# Patient Record
Sex: MALE | Race: WHITE | NOT HISPANIC OR LATINO | Employment: OTHER | URBAN - METROPOLITAN AREA
[De-identification: names, ages, dates, MRNs, and addresses within clinical notes are randomized per-mention and may not be internally consistent; named-entity substitution may affect disease eponyms.]

---

## 2023-08-28 PROBLEM — G51.0 BELL'S PALSY: Status: ACTIVE | Noted: 2023-08-28

## 2023-08-28 PROBLEM — E55.9 VITAMIN D DEFICIENCY: Status: ACTIVE | Noted: 2023-08-28

## 2023-08-28 PROBLEM — R93.1 ELEVATED CORONARY ARTERY CALCIUM SCORE: Status: ACTIVE | Noted: 2023-08-28

## 2023-08-28 PROBLEM — R91.1 PULMONARY NODULE: Status: ACTIVE | Noted: 2023-08-28

## 2023-08-28 PROBLEM — E78.1 HYPERTRIGLYCERIDEMIA: Status: ACTIVE | Noted: 2023-08-28

## 2023-08-28 PROBLEM — D69.6 THROMBOCYTOPENIA (CMS-HCC): Status: ACTIVE | Noted: 2023-08-28

## 2023-08-28 PROBLEM — I71.21 ANEURYSM OF ASCENDING AORTA WITHOUT RUPTURE (CMS-HCC): Status: ACTIVE | Noted: 2023-08-28

## 2023-08-28 PROBLEM — I10 ESSENTIAL HYPERTENSION: Status: ACTIVE | Noted: 2023-08-28

## 2023-08-28 PROBLEM — K70.0 ALCOHOLIC FATTY LIVER: Status: ACTIVE | Noted: 2023-08-28

## 2023-08-28 PROBLEM — L71.9 ROSACEA: Status: ACTIVE | Noted: 2023-08-28

## 2023-08-28 PROBLEM — M1A.9XX0 CHRONIC GOUT WITHOUT TOPHUS: Status: ACTIVE | Noted: 2023-08-28

## 2023-08-28 PROBLEM — F10.10 ETOH ABUSE: Status: ACTIVE | Noted: 2023-08-28

## 2023-08-28 RX ORDER — DOXYCYCLINE HYCLATE 50 MG/1
1 CAPSULE ORAL DAILY
COMMUNITY
End: 2023-12-01 | Stop reason: ALTCHOICE

## 2023-08-28 RX ORDER — ALLOPURINOL 300 MG/1
1 TABLET ORAL DAILY
COMMUNITY
End: 2023-12-01 | Stop reason: DRUGHIGH

## 2023-08-28 RX ORDER — MULTIVIT WITH CALCIUM,IRON,MIN 18MG-0.4MG
TABLET ORAL
COMMUNITY

## 2023-08-28 RX ORDER — PNV NO.95/FERROUS FUM/FOLIC AC 28MG-0.8MG
1 TABLET ORAL DAILY
COMMUNITY
Start: 2014-09-12

## 2023-08-28 RX ORDER — FENOFIBRATE 67 MG/1
1 CAPSULE ORAL
COMMUNITY
End: 2023-12-01 | Stop reason: SDUPTHER

## 2023-08-28 RX ORDER — ACETAMINOPHEN 500 MG
1 TABLET ORAL DAILY
COMMUNITY
Start: 2019-03-27

## 2023-08-28 RX ORDER — ATORVASTATIN CALCIUM 10 MG/1
1 TABLET, FILM COATED ORAL DAILY
COMMUNITY
End: 2024-06-07 | Stop reason: SDUPTHER

## 2023-11-02 ENCOUNTER — LAB (OUTPATIENT)
Dept: LAB | Facility: LAB | Age: 58
End: 2023-11-02
Payer: COMMERCIAL

## 2023-11-02 DIAGNOSIS — E53.8 DEFICIENCY OF OTHER SPECIFIED B GROUP VITAMINS: ICD-10-CM

## 2023-11-02 DIAGNOSIS — E79.0 HYPERURICEMIA WITHOUT SIGNS OF INFLAMMATORY ARTHRITIS AND TOPHACEOUS DISEASE: ICD-10-CM

## 2023-11-02 DIAGNOSIS — Z12.5 ENCOUNTER FOR SCREENING FOR MALIGNANT NEOPLASM OF PROSTATE: ICD-10-CM

## 2023-11-02 DIAGNOSIS — I10 ESSENTIAL (PRIMARY) HYPERTENSION: ICD-10-CM

## 2023-11-02 DIAGNOSIS — K76.0 FATTY (CHANGE OF) LIVER, NOT ELSEWHERE CLASSIFIED: Primary | ICD-10-CM

## 2023-11-02 DIAGNOSIS — E55.9 VITAMIN D DEFICIENCY, UNSPECIFIED: ICD-10-CM

## 2023-11-02 DIAGNOSIS — E78.1 PURE HYPERGLYCERIDEMIA: ICD-10-CM

## 2023-11-02 LAB
25(OH)D3 SERPL-MCNC: 34 NG/ML (ref 31–100)
ALBUMIN SERPL-MCNC: 4.9 G/DL (ref 3.5–5)
ALP BLD-CCNC: 55 U/L (ref 35–125)
ALT SERPL-CCNC: 24 U/L (ref 5–40)
ANION GAP SERPL CALC-SCNC: >19 MMOL/L
AST SERPL-CCNC: 20 U/L (ref 5–40)
BILIRUB SERPL-MCNC: 0.7 MG/DL (ref 0.1–1.2)
BUN SERPL-MCNC: 17 MG/DL (ref 8–25)
CALCIUM SERPL-MCNC: 9.6 MG/DL (ref 8.5–10.4)
CHLORIDE SERPL-SCNC: 101 MMOL/L (ref 97–107)
CHOLEST SERPL-MCNC: 130 MG/DL (ref 133–200)
CHOLEST/HDLC SERPL: 3.9 {RATIO}
CO2 SERPL-SCNC: 20 MMOL/L (ref 24–31)
CREAT SERPL-MCNC: 1 MG/DL (ref 0.4–1.6)
ERYTHROCYTE [DISTWIDTH] IN BLOOD BY AUTOMATED COUNT: 13 % (ref 11.5–14.5)
GFR SERPL CREATININE-BSD FRML MDRD: 88 ML/MIN/1.73M*2
GLUCOSE SERPL-MCNC: 80 MG/DL (ref 65–99)
HCT VFR BLD AUTO: 43.3 % (ref 41–52)
HDLC SERPL-MCNC: 33 MG/DL
HGB BLD-MCNC: 14.7 G/DL (ref 13.5–17.5)
LDLC SERPL CALC-MCNC: 46 MG/DL (ref 65–130)
MCH RBC QN AUTO: 29.9 PG (ref 26–34)
MCHC RBC AUTO-ENTMCNC: 33.9 G/DL (ref 32–36)
MCV RBC AUTO: 88 FL (ref 80–100)
NRBC BLD-RTO: 0 /100 WBCS (ref 0–0)
PLATELET # BLD AUTO: 126 X10*3/UL (ref 150–450)
POTASSIUM SERPL-SCNC: 4.4 MMOL/L (ref 3.4–5.1)
PROT SERPL-MCNC: 7.2 G/DL (ref 5.9–7.9)
PSA SERPL-MCNC: 2 NG/ML
RBC # BLD AUTO: 4.92 X10*6/UL (ref 4.5–5.9)
SODIUM SERPL-SCNC: 141 MMOL/L (ref 133–145)
TRIGL SERPL-MCNC: 254 MG/DL (ref 40–150)
TSH SERPL DL<=0.05 MIU/L-ACNC: 2.46 MIU/L (ref 0.27–4.2)
URATE SERPL-MCNC: 7.1 MG/DL (ref 3.6–7.7)
VIT B12 SERPL-MCNC: 604 PG/ML (ref 211–946)
WBC # BLD AUTO: 6 X10*3/UL (ref 4.4–11.3)

## 2023-11-02 PROCEDURE — 82306 VITAMIN D 25 HYDROXY: CPT

## 2023-11-02 PROCEDURE — 80053 COMPREHEN METABOLIC PANEL: CPT

## 2023-11-02 PROCEDURE — 80061 LIPID PANEL: CPT

## 2023-11-02 PROCEDURE — 36415 COLL VENOUS BLD VENIPUNCTURE: CPT

## 2023-11-02 PROCEDURE — 84550 ASSAY OF BLOOD/URIC ACID: CPT

## 2023-11-02 PROCEDURE — 85027 COMPLETE CBC AUTOMATED: CPT

## 2023-11-02 PROCEDURE — 82607 VITAMIN B-12: CPT

## 2023-11-02 PROCEDURE — 84443 ASSAY THYROID STIM HORMONE: CPT

## 2023-11-02 PROCEDURE — 84153 ASSAY OF PSA TOTAL: CPT

## 2023-11-03 RX ORDER — TRAMADOL HYDROCHLORIDE 50 MG/1
1 TABLET ORAL
COMMUNITY
Start: 2023-04-21 | End: 2023-12-01 | Stop reason: WASHOUT

## 2023-11-03 RX ORDER — IBUPROFEN 800 MG/1
1 TABLET ORAL EVERY 6 HOURS PRN
COMMUNITY
Start: 2023-04-21 | End: 2023-12-01 | Stop reason: WASHOUT

## 2023-11-03 RX ORDER — INDOMETHACIN 50 MG/1
CAPSULE ORAL
COMMUNITY
Start: 2014-06-01 | End: 2023-12-01 | Stop reason: WASHOUT

## 2023-11-06 ENCOUNTER — OFFICE VISIT (OUTPATIENT)
Dept: CARDIOLOGY | Facility: CLINIC | Age: 58
End: 2023-11-06
Payer: COMMERCIAL

## 2023-11-06 VITALS — BODY MASS INDEX: 29.56 KG/M2 | OXYGEN SATURATION: 99 % | HEART RATE: 72 BPM | WEIGHT: 206 LBS

## 2023-11-06 DIAGNOSIS — R93.1 ELEVATED CORONARY ARTERY CALCIUM SCORE: ICD-10-CM

## 2023-11-06 DIAGNOSIS — I10 ESSENTIAL HYPERTENSION: Primary | ICD-10-CM

## 2023-11-06 DIAGNOSIS — I71.21 ANEURYSM OF ASCENDING AORTA WITHOUT RUPTURE (CMS-HCC): ICD-10-CM

## 2023-11-06 DIAGNOSIS — E78.1 HYPERTRIGLYCERIDEMIA: ICD-10-CM

## 2023-11-06 PROCEDURE — 99213 OFFICE O/P EST LOW 20 MIN: CPT | Performed by: INTERNAL MEDICINE

## 2023-11-06 PROCEDURE — 1036F TOBACCO NON-USER: CPT | Performed by: INTERNAL MEDICINE

## 2023-11-06 PROCEDURE — 3074F SYST BP LT 130 MM HG: CPT | Performed by: INTERNAL MEDICINE

## 2023-11-06 PROCEDURE — 3078F DIAST BP <80 MM HG: CPT | Performed by: INTERNAL MEDICINE

## 2023-11-06 ASSESSMENT — ENCOUNTER SYMPTOMS
CARDIOVASCULAR NEGATIVE: 1
NEUROLOGICAL NEGATIVE: 1
RESPIRATORY NEGATIVE: 1
LOSS OF SENSATION IN FEET: 0
MUSCULOSKELETAL NEGATIVE: 1
CONSTITUTIONAL NEGATIVE: 1
GASTROINTESTINAL NEGATIVE: 1
OCCASIONAL FEELINGS OF UNSTEADINESS: 0

## 2023-11-06 ASSESSMENT — PATIENT HEALTH QUESTIONNAIRE - PHQ9
1. LITTLE INTEREST OR PLEASURE IN DOING THINGS: NOT AT ALL
SUM OF ALL RESPONSES TO PHQ9 QUESTIONS 1 AND 2: 0
2. FEELING DOWN, DEPRESSED OR HOPELESS: NOT AT ALL

## 2023-11-06 ASSESSMENT — PAIN SCALES - GENERAL: PAINLEVEL: 0-NO PAIN

## 2023-11-06 NOTE — PROGRESS NOTES
Subjective   John Johnson is a 58 y.o. male.    Chief Complaint:  Follow-up (6 month follow up hypertension)    HPI  Patient doing well.  Exercising and watching calorie intake and is able to lose weight over the last 6 months.  Review of Systems   Constitutional: Negative.   HENT: Negative.     Cardiovascular: Negative.    Respiratory: Negative.     Musculoskeletal: Negative.    Gastrointestinal: Negative.    Genitourinary: Negative.    Neurological: Negative.        Objective   Constitutional:       Appearance: Not in distress.   Eyes:      Conjunctiva/sclera: Conjunctivae normal.   Neck:      Vascular: JVD normal.   Pulmonary:      Breath sounds: Normal breath sounds. No wheezing. No rhonchi. No rales.   Cardiovascular:      Normal rate. Regular rhythm.      Murmurs: There is no murmur.      No gallop.  No click. No rub.   Abdominal:      Palpations: Abdomen is soft.   Neurological:      General: No focal deficit present.      Mental Status: Alert.         Lab Review:       Assessment/Plan   The primary encounter diagnosis was Essential hypertension. Diagnoses of Hypertriglyceridemia, Elevated coronary artery calcium score, and Aneurysm of ascending aorta without rupture (CMS/HCC) were also pertinent to this visit.    Aneurysm of ascending aorta without rupture (CMS/HCC)  Last diameter down to 3.8?, plan on repeating CT approx. 4/24    Elevated coronary artery calcium score  Continue standard risk factor modification.    Hypertriglyceridemia  Reviewed lipid panel:  LDL 46 with .  TG trending down with diet and exercise.    Essential hypertension  Blood pressure currently very well controlled, no changes need to be made.

## 2023-12-01 ENCOUNTER — OFFICE VISIT (OUTPATIENT)
Dept: PRIMARY CARE | Facility: CLINIC | Age: 58
End: 2023-12-01
Payer: COMMERCIAL

## 2023-12-01 VITALS
TEMPERATURE: 97 F | OXYGEN SATURATION: 98 % | HEIGHT: 70 IN | WEIGHT: 209.8 LBS | BODY MASS INDEX: 30.03 KG/M2 | HEART RATE: 66 BPM | SYSTOLIC BLOOD PRESSURE: 122 MMHG | DIASTOLIC BLOOD PRESSURE: 80 MMHG

## 2023-12-01 DIAGNOSIS — K70.0 ALCOHOLIC FATTY LIVER: ICD-10-CM

## 2023-12-01 DIAGNOSIS — I10 ESSENTIAL HYPERTENSION: Primary | ICD-10-CM

## 2023-12-01 DIAGNOSIS — D69.6 THROMBOCYTOPENIA (CMS-HCC): ICD-10-CM

## 2023-12-01 DIAGNOSIS — M1A.9XX0 CHRONIC GOUT WITHOUT TOPHUS, UNSPECIFIED CAUSE, UNSPECIFIED SITE: ICD-10-CM

## 2023-12-01 DIAGNOSIS — E55.9 VITAMIN D DEFICIENCY: ICD-10-CM

## 2023-12-01 DIAGNOSIS — E78.1 HYPERTRIGLYCERIDEMIA: ICD-10-CM

## 2023-12-01 DIAGNOSIS — I71.21 ANEURYSM OF ASCENDING AORTA WITHOUT RUPTURE (CMS-HCC): ICD-10-CM

## 2023-12-01 PROCEDURE — 1036F TOBACCO NON-USER: CPT | Performed by: FAMILY MEDICINE

## 2023-12-01 PROCEDURE — 3074F SYST BP LT 130 MM HG: CPT | Performed by: FAMILY MEDICINE

## 2023-12-01 PROCEDURE — 3079F DIAST BP 80-89 MM HG: CPT | Performed by: FAMILY MEDICINE

## 2023-12-01 PROCEDURE — 99214 OFFICE O/P EST MOD 30 MIN: CPT | Performed by: FAMILY MEDICINE

## 2023-12-01 RX ORDER — DOXYCYCLINE HYCLATE 50 MG/1
50 CAPSULE ORAL DAILY
COMMUNITY
End: 2024-06-03 | Stop reason: SDUPTHER

## 2023-12-01 RX ORDER — ALLOPURINOL 100 MG/1
100 TABLET ORAL DAILY
Qty: 90 TABLET | Refills: 1 | Status: SHIPPED | OUTPATIENT
Start: 2023-12-01 | End: 2024-06-06

## 2023-12-01 RX ORDER — FENOFIBRATE 67 MG/1
67 CAPSULE ORAL
Qty: 90 CAPSULE | Refills: 3 | Status: SHIPPED | OUTPATIENT
Start: 2023-12-01 | End: 2024-11-30

## 2023-12-01 ASSESSMENT — LIFESTYLE VARIABLES
HOW OFTEN DO YOU HAVE SIX OR MORE DRINKS ON ONE OCCASION: LESS THAN MONTHLY
HOW OFTEN DO YOU HAVE A DRINK CONTAINING ALCOHOL: 4 OR MORE TIMES A WEEK
SKIP TO QUESTIONS 9-10: 0
HOW MANY STANDARD DRINKS CONTAINING ALCOHOL DO YOU HAVE ON A TYPICAL DAY: 1 OR 2
AUDIT-C TOTAL SCORE: 5

## 2023-12-01 ASSESSMENT — PATIENT HEALTH QUESTIONNAIRE - PHQ9
1. LITTLE INTEREST OR PLEASURE IN DOING THINGS: NOT AT ALL
2. FEELING DOWN, DEPRESSED OR HOPELESS: NOT AT ALL
SUM OF ALL RESPONSES TO PHQ9 QUESTIONS 1 AND 2: 0

## 2023-12-01 ASSESSMENT — PAIN SCALES - GENERAL: PAINLEVEL: 0-NO PAIN

## 2023-12-01 ASSESSMENT — ENCOUNTER SYMPTOMS
OCCASIONAL FEELINGS OF UNSTEADINESS: 0
DEPRESSION: 0
LOSS OF SENSATION IN FEET: 0

## 2023-12-01 NOTE — ASSESSMENT & PLAN NOTE
- Recent cardiology follow-up note reviewed with no acute changes  -Continue with repeat chest CT as ordered

## 2023-12-01 NOTE — PATIENT INSTRUCTIONS
Problem List Items Addressed This Visit             ICD-10-CM    Aneurysm of ascending aorta without rupture (CMS/HCC) I71.21     - Recent cardiology follow-up note reviewed with no acute changes  -Continue with repeat chest CT as ordered         Chronic gout without tophus [id number]     - Uric acid level within normal range though slightly above goal of less than 7 on allopurinol therapy.  This does make sense as you have been without your allopurinol for approximately 3 months  -While you have been diligently working on your diet/lifestyle and have not had a recurrent gout flare, there is likely still benefit to being on some form of allopurinol particularly as your levels have gone up.  At this time we will initiate 100 mg daily regimen with plans to monitor blood work at follow-up         Relevant Medications    allopurinol (Zyloprim) 100 mg tablet    Vitamin D deficiency E55.9     - Vitamin D level stable on most recent panel         Thrombocytopenia (CMS/HCC) D69.6     - Platelet count stable to baseline on most recent blood work         Alcoholic fatty liver K70.0     - Liver enzyme level stable on most recent blood work         Hypertriglyceridemia E78.1     - Triglyceride level remains above goal though continues to improve  -Please continue to focus on compliance with statin regimen along with healthy, low-fat diet         Relevant Medications    fenofibrate micronized (Lofibra) 67 mg capsule    Essential hypertension - Primary I10     - Blood pressure stable in office today

## 2023-12-01 NOTE — ASSESSMENT & PLAN NOTE
- Uric acid level within normal range though slightly above goal of less than 7 on allopurinol therapy.  This does make sense as you have been without your allopurinol for approximately 3 months  -While you have been diligently working on your diet/lifestyle and have not had a recurrent gout flare, there is likely still benefit to being on some form of allopurinol particularly as your levels have gone up.  At this time we will initiate 100 mg daily regimen with plans to monitor blood work at follow-up

## 2023-12-01 NOTE — PROGRESS NOTES
Outpatient Visit Note    Chief Complaint   Patient presents with    Follow-up     6 month f/u         HPI:  John Johnson is a 58 y.o. male with a past medical history significant for alcoholic fatty liver disease, hypertension with known aortic aneurysm followed by Dr. Tompkins of Cardiology, hyperuricemia, thrombocytopenia, elevated triglyceride levels and vitamin-D/B12 deficiency who presents to the office for 6-month follow-up.  He was last seen in the office on 6/1/2023 as a new patient with request to establish care.    In review, he was most recently established at [facility], having last been seen on 11/21/2022 for annual well exam. Blood work was completed prior to that encounter including hepatic function panel in uric acid level which were both unremarkable.           Of note, patient had identified pulmonary nodule with repeat chest CT on 04/19/2023 which showed resolution. CT did incidentally note splenomegaly to which clinical correlation was recommended. Patient denies any abdominal symptoms, fatigue or recent illnesses.    Patient recently completed blood work on 11/2/2023 including CBC, CMP, lipid panel, TSH, uric acid level, vitamin D D, PSA and B12.  Blood work was remarkable for stable thrombocytopenia and continued though downtrending hypertriglyceridemia.    Hypertension/aortic aneurysm:  Patient had routine follow-up with Cardiology on 11/6/2023 with no acute changes at that time. Blood pressure had remained stable. Patient is currently set for routine 6 month follow-up. Denies any chest pain, shortness a breath, lightheadedness or dizziness.           Fatty liver:  Liver enzyme levels stable with most recent blood work. In regards to GI follow-up, states to have been seen by GI in the past with liver biopsy completed though no active specialist he is following with at this time. Patient was noted to have colonoscopy in August 2013. Has remained compliant with  atorvastatin and fenofibrate regimen. Last lipid panel completed approximately 1 year ago which was stable outside of continued hypertriglyceridemia.           Gout:  Has remain compliant with allopurinol regimen denying any recent gout flares. States to have had 1 flare when he was ultimately placed on allopurinol regimen. Questions if he needs to be a medication though denies any adverse side effects and is happy that he has not had any recurrent attack. Has attempted to modify his dietary habits. Does drink beer regularly, though has attempted to cut back secondary to liver issues.  Does admit to have stopped taking his allopurinol approximately 3 months ago with no gout flares.  Uric acid level was on high normal range at 7.1 with recent blood work.           Vitamin D/B12 deficiency:  Admits to compliance with daily vitamin supplementation with level stable on most recent blood work    Current Medications  Current Outpatient Medications   Medication Instructions    allopurinol (ZYLOPRIM) 100 mg, oral, Daily    atorvastatin (Lipitor) 10 mg tablet 1 tablet, oral, Daily    cholecalciferol (Vitamin D3) 5,000 Units tablet 1 tablet, oral, Daily    cyanocobalamin (Vitamin B-12) 100 mcg tablet 1 tablet, oral, Daily    doxycycline (VIBRAMYCIN) 50 mg, oral, 2 times daily, Take with at least 8 ounces (large glass) of water, do not lie down for 30 minutes after    fenofibrate micronized (LOFIBRA) 67 mg, oral, Daily with breakfast    fsh/flx/prim/cur/bor/om3,6,9 5 (OMEGA 3-6-9 FATTY ACIDS ORAL) 2 tablets, oral, 2 times daily    glucosamine-chondroitin 500-400 mg capsule oral        Allergies  No Known Allergies     Past Medical History:   Diagnosis Date    Coronary artery disease     Fatty liver due to alcoholism     Heart murmur     Hypertension     Hyperuricemia     Thrombocytopenia (CMS/HCC)       Past Surgical History:   Procedure Laterality Date    US GUIDED PERCUTANEOUS PLACEMENT  4/30/2018    US GUIDED PERCUTANEOUS  PLACEMENT [facility]     Family History   Problem Relation Name Age of Onset    Cancer Father Yash Johnson     Brain cancer Father Yash Johnson     Other (brain tumor) Father Yash Johnson      Social History     Tobacco Use    Smoking status: Never    Smokeless tobacco: Never   Vaping Use    Vaping Use: Never used   Substance Use Topics    Alcohol use: Yes     Alcohol/week: 12.0 standard drinks of alcohol     Types: 12 Cans of beer per week    Drug use: Never       ROS  All pertinent positive symptoms are included in the history of present illness.  All other systems have been reviewed and are negative and noncontributory to this patient's current ailments.    VITAL SIGNS  Vitals:    12/01/23 1138   BP: 122/80   Pulse: 66   Temp: 36.1 °C (97 °F)   SpO2: 98%       PHYSICAL EXAM  GENERAL APPEARANCE: alert and oriented, Pleasant and cooperative, No Acute Distress  HEENT: EOMI, PERRLA, MMM  HEART: RRR, normal S1S2, no murmurs, click or rubs  LUNGS: clear to auscultation bilaterally, no wheezes/rhonchi/rales  EXTREMITIES: no edema, normal ROM  SKIN: normal, no rash, unremarkable  NEUROLOGIC EXAM: non-focal exam  MUSCULOSKELETAL: no gross abnormalities  PSYCH: affect is normal, eye contact is good      Assessment/Plan   Problem List Items Addressed This Visit             ICD-10-CM    Aneurysm of ascending aorta without rupture (CMS/HCC) I71.21     - Recent cardiology follow-up note reviewed with no acute changes  -Continue with repeat chest CT as ordered         Chronic gout without tophus [id number].[id number]     - Uric acid level within normal range though slightly above goal of less than 7 on allopurinol therapy.  This does make sense as you have been without your allopurinol for approximately 3 months  -While you have been diligently working on your diet/lifestyle and have not had a recurrent gout flare, there is likely still benefit to being on some form of allopurinol particularly as your levels have gone up.  At  this time we will initiate 100 mg daily regimen with plans to monitor blood work at follow-up         Relevant Medications    allopurinol (Zyloprim) 100 mg tablet    Vitamin D deficiency E55.9     - Vitamin D level stable on most recent panel         Thrombocytopenia (CMS/HCC) D69.6     - Platelet count stable to baseline on most recent blood work         Alcoholic fatty liver K70.0     - Liver enzyme level stable on most recent blood work         Hypertriglyceridemia E78.1     - Triglyceride level remains above goal though continues to improve  -Please continue to focus on compliance with statin regimen along with healthy, low-fat diet         Relevant Medications    fenofibrate micronized (Lofibra) 67 mg capsule    Essential hypertension - Primary I10     - Blood pressure stable in office today

## 2023-12-01 NOTE — ASSESSMENT & PLAN NOTE
- Triglyceride level remains above goal though continues to improve  -Please continue to focus on compliance with statin regimen along with healthy, low-fat diet

## 2023-12-14 ENCOUNTER — LAB (OUTPATIENT)
Dept: LAB | Facility: LAB | Age: 58
End: 2023-12-14
Payer: COMMERCIAL

## 2023-12-14 ENCOUNTER — OFFICE VISIT (OUTPATIENT)
Dept: PRIMARY CARE | Facility: CLINIC | Age: 58
End: 2023-12-14
Payer: COMMERCIAL

## 2023-12-14 VITALS
OXYGEN SATURATION: 99 % | DIASTOLIC BLOOD PRESSURE: 78 MMHG | BODY MASS INDEX: 29.72 KG/M2 | HEIGHT: 70 IN | WEIGHT: 207.6 LBS | TEMPERATURE: 96.8 F | SYSTOLIC BLOOD PRESSURE: 118 MMHG | HEART RATE: 63 BPM

## 2023-12-14 DIAGNOSIS — Z11.3 SCREENING FOR STDS (SEXUALLY TRANSMITTED DISEASES): ICD-10-CM

## 2023-12-14 DIAGNOSIS — E78.1 HYPERTRIGLYCERIDEMIA: ICD-10-CM

## 2023-12-14 DIAGNOSIS — N48.89 PENILE IRRITATION: Primary | ICD-10-CM

## 2023-12-14 DIAGNOSIS — D69.6 THROMBOCYTOPENIA (CMS-HCC): ICD-10-CM

## 2023-12-14 DIAGNOSIS — N48.89 PENILE IRRITATION: ICD-10-CM

## 2023-12-14 PROBLEM — I10 ESSENTIAL HYPERTENSION: Status: RESOLVED | Noted: 2023-08-28 | Resolved: 2023-12-14

## 2023-12-14 PROBLEM — F10.10 ETOH ABUSE: Status: RESOLVED | Noted: 2023-08-28 | Resolved: 2023-12-14

## 2023-12-14 LAB
HERPES SIMPLEX VIRUS 1 IGG: >8 INDEX
HERPES SIMPLEX VIRUS 2 IGG: <0.2 INDEX
HIV 1+2 AB+HIV1 P24 AG SERPL QL IA: NONREACTIVE
T PALLIDUM AB SER QL: NONREACTIVE

## 2023-12-14 PROCEDURE — 99214 OFFICE O/P EST MOD 30 MIN: CPT | Performed by: FAMILY MEDICINE

## 2023-12-14 PROCEDURE — 87800 DETECT AGNT MULT DNA DIREC: CPT

## 2023-12-14 PROCEDURE — 86695 HERPES SIMPLEX TYPE 1 TEST: CPT

## 2023-12-14 PROCEDURE — 86780 TREPONEMA PALLIDUM: CPT

## 2023-12-14 PROCEDURE — 87389 HIV-1 AG W/HIV-1&-2 AB AG IA: CPT

## 2023-12-14 PROCEDURE — 36415 COLL VENOUS BLD VENIPUNCTURE: CPT

## 2023-12-14 PROCEDURE — 86696 HERPES SIMPLEX TYPE 2 TEST: CPT

## 2023-12-14 PROCEDURE — 1036F TOBACCO NON-USER: CPT | Performed by: FAMILY MEDICINE

## 2023-12-14 RX ORDER — CIPROFLOXACIN 500 MG/1
500 TABLET ORAL ONCE
Qty: 1 TABLET | Refills: 0 | Status: SHIPPED | OUTPATIENT
Start: 2023-12-14 | End: 2023-12-14

## 2023-12-14 RX ORDER — AZITHROMYCIN 500 MG/1
500 TABLET, FILM COATED ORAL ONCE
Qty: 1 TABLET | Refills: 0 | Status: SHIPPED | OUTPATIENT
Start: 2023-12-14 | End: 2023-12-14

## 2023-12-14 ASSESSMENT — PAIN SCALES - GENERAL: PAINLEVEL: 1

## 2023-12-14 NOTE — PROGRESS NOTES
Outpatient Visit Note    Chief Complaint   Patient presents with    Penis Discomfort     Slight discomfort intermittent for a few weeks. Noticed after unprotected sex         HPI:  John Johnson is a 58 y.o. male with a past medical history significant for alcoholic fatty liver disease, hypertension with known aortic aneurysm followed by Dr. Tompkins of Cardiology, hyperuricemia, thrombocytopenia, elevated triglyceride levels and vitamin-D/B12 deficiency who presents to the office secondary to genital concerns.  He was recently seen in the office on 12/1/2023 for 6-month follow-up.     Patient completed blood work on 11/2/2023 including CBC, CMP, lipid panel, TSH, uric acid level, vitamin D D, PSA and B12.  Blood work was remarkable for stable thrombocytopenia and continued though downtrending hypertriglyceridemia.    Today he reports intermittent penile irritation over the last several weeks.  States his symptoms started after having unprotected sex with a new sexual partner.  States that symptoms have been mild and fleeting with no particular pattern of onset.  Denies any fever, chills, nausea, vomiting, abdominal pain, dysuria, hematuria, penile discharge, testicular pain or genital rash.  Has been in communication with new partner with no expressed symptomatic concerns or reports of recent STD screenings.    Of note, patient does admit to continued use of doxycycline.    Current Medications  Current Outpatient Medications   Medication Instructions    allopurinol (ZYLOPRIM) 100 mg, oral, Daily    atorvastatin (Lipitor) 10 mg tablet 1 tablet, oral, Daily    azithromycin (ZITHROMAX) 500 mg, oral, Once    cholecalciferol (Vitamin D3) 5,000 Units tablet 1 tablet, oral, Daily    ciprofloxacin (CIPRO) 500 mg, oral, Once    cyanocobalamin (Vitamin B-12) 100 mcg tablet 1 tablet, oral, Daily    doxycycline (VIBRAMYCIN) 50 mg, oral, Daily, Take with at least 8 ounces (large glass) of water, do not lie down for 30  minutes after    fenofibrate micronized (LOFIBRA) 67 mg, oral, Daily with breakfast    fsh/flx/prim/cur/bor/om3,6,9 5 (OMEGA 3-6-9 FATTY ACIDS ORAL) 2 tablets, oral, 2 times daily    glucosamine-chondroitin 500-400 mg capsule oral        Allergies  No Known Allergies     Past Medical History:   Diagnosis Date    Coronary artery disease     Fatty liver due to alcoholism     Heart murmur     Hypertension     Hyperuricemia     Thrombocytopenia (CMS/HCC)       Past Surgical History:   Procedure Laterality Date    US GUIDED PERCUTANEOUS PLACEMENT  4/30/2018    US GUIDED PERCUTANEOUS PLACEMENT [facility]     Family History   Problem Relation Name Age of Onset    Cancer Father Yash Johnson     Brain cancer Father Yash Johnson     Other (brain tumor) Father Yash Johnson      Social History     Tobacco Use    Smoking status: Never    Smokeless tobacco: Never   Vaping Use    Vaping Use: Never used   Substance Use Topics    Alcohol use: Yes     Alcohol/week: 10.0 standard drinks of alcohol     Types: 10 Cans of beer per week    Drug use: Never       ROS  All pertinent positive symptoms are included in the history of present illness.  All other systems have been reviewed and are negative and noncontributory to this patient's current ailments.    VITAL SIGNS  Vitals:    12/14/23 1016   BP: 118/78   Pulse: 63   Temp: 36 °C (96.8 °F)   SpO2: 99%         PHYSICAL EXAM  GENERAL APPEARANCE: alert and oriented, Pleasant and cooperative, No Acute Distress  HEENT: EOMI, PERRLA, MMM  EXTREMITIES: no edema, normal ROM  : Deferred  SKIN: normal, no rash, unremarkable  NEUROLOGIC EXAM: non-focal exam  MUSCULOSKELETAL: no gross abnormalities  PSYCH: affect is normal, eye contact is good      Assessment/Plan   Problem List Items Addressed This Visit             ICD-10-CM    Thrombocytopenia (CMS/HCC) D69.6    Relevant Orders    CBC    Hypertriglyceridemia E78.1     - Will place future blood orders to have completed prior to  cardiology and office follow-up         Relevant Orders    Lipid panel    Comprehensive metabolic panel    Penile irritation - Primary N48.89     - Secondary to intermittent symptoms following unprotected sexual activity with new partner, will plan to complete STD screening panel  -We will separately empirically treat against potential exposure to gonorrhea/chlamydia using alternative therapy to doxycycline as you already take low-dose  -Safe sex practices advocated including use of condoms  -If symptoms persist following testing, can coordinate further evaluation including possible urology consultation         Relevant Medications    azithromycin (Zithromax) 500 mg tablet    ciprofloxacin (Cipro) 500 mg tablet    Other Relevant Orders    C. Trachomatis / N. Gonorrhoeae, Amplified Detection    HIV 1/2 Antigen/Antibody Screen with Reflex to Confirmation    Syphilis Screen with Reflex    HSV1 IgG and HSV2 IgG    Screening for STDs (sexually transmitted diseases) Z11.3    Relevant Orders    C. Trachomatis / N. Gonorrhoeae, Amplified Detection    HIV 1/2 Antigen/Antibody Screen with Reflex to Confirmation    Syphilis Screen with Reflex    HSV1 IgG and HSV2 IgG

## 2023-12-14 NOTE — ASSESSMENT & PLAN NOTE
- Secondary to intermittent symptoms following unprotected sexual activity with new partner, will plan to complete STD screening panel  -We will separately empirically treat against potential exposure to gonorrhea/chlamydia using alternative therapy to doxycycline as you already take low-dose  -Safe sex practices advocated including use of condoms  -If symptoms persist following testing, can coordinate further evaluation including possible urology consultation

## 2023-12-15 LAB
C TRACH RRNA SPEC QL NAA+PROBE: NEGATIVE
N GONORRHOEA DNA SPEC QL PROBE+SIG AMP: NEGATIVE

## 2024-04-11 ENCOUNTER — PATIENT MESSAGE (OUTPATIENT)
Dept: PRIMARY CARE | Facility: CLINIC | Age: 59
End: 2024-04-11
Payer: COMMERCIAL

## 2024-04-11 DIAGNOSIS — M25.569 KNEE PAIN, UNSPECIFIED CHRONICITY, UNSPECIFIED LATERALITY: Primary | ICD-10-CM

## 2024-04-24 ENCOUNTER — HOSPITAL ENCOUNTER (OUTPATIENT)
Dept: RADIOLOGY | Facility: CLINIC | Age: 59
Discharge: HOME | End: 2024-04-24
Payer: COMMERCIAL

## 2024-04-24 ENCOUNTER — LAB (OUTPATIENT)
Dept: LAB | Facility: LAB | Age: 59
End: 2024-04-24
Payer: COMMERCIAL

## 2024-04-24 ENCOUNTER — OFFICE VISIT (OUTPATIENT)
Dept: ORTHOPEDIC SURGERY | Facility: CLINIC | Age: 59
End: 2024-04-24
Payer: COMMERCIAL

## 2024-04-24 VITALS — BODY MASS INDEX: 29.73 KG/M2 | HEIGHT: 70 IN | WEIGHT: 207.67 LBS

## 2024-04-24 DIAGNOSIS — S83.92XA SPRAIN OF LEFT KNEE, INITIAL ENCOUNTER: ICD-10-CM

## 2024-04-24 DIAGNOSIS — M25.562 PAIN IN BOTH KNEES, UNSPECIFIED CHRONICITY: ICD-10-CM

## 2024-04-24 DIAGNOSIS — S83.91XA SPRAIN OF RIGHT KNEE, INITIAL ENCOUNTER: ICD-10-CM

## 2024-04-24 DIAGNOSIS — D69.6 THROMBOCYTOPENIA (CMS-HCC): ICD-10-CM

## 2024-04-24 DIAGNOSIS — M25.569 KNEE PAIN, UNSPECIFIED CHRONICITY, UNSPECIFIED LATERALITY: Primary | ICD-10-CM

## 2024-04-24 DIAGNOSIS — M25.569 KNEE PAIN: ICD-10-CM

## 2024-04-24 DIAGNOSIS — M25.561 PAIN IN BOTH KNEES, UNSPECIFIED CHRONICITY: ICD-10-CM

## 2024-04-24 DIAGNOSIS — E78.1 HYPERTRIGLYCERIDEMIA: ICD-10-CM

## 2024-04-24 LAB
ALBUMIN SERPL-MCNC: 5 G/DL (ref 3.5–5)
ALP BLD-CCNC: 54 U/L (ref 35–125)
ALT SERPL-CCNC: 44 U/L (ref 5–40)
ANION GAP SERPL CALC-SCNC: 15 MMOL/L
AST SERPL-CCNC: 28 U/L (ref 5–40)
BILIRUB SERPL-MCNC: 0.9 MG/DL (ref 0.1–1.2)
BUN SERPL-MCNC: 18 MG/DL (ref 8–25)
CALCIUM SERPL-MCNC: 9.7 MG/DL (ref 8.5–10.4)
CHLORIDE SERPL-SCNC: 104 MMOL/L (ref 97–107)
CHOLEST SERPL-MCNC: 135 MG/DL (ref 133–200)
CHOLEST/HDLC SERPL: 4.2 {RATIO}
CO2 SERPL-SCNC: 24 MMOL/L (ref 24–31)
CREAT SERPL-MCNC: 1.1 MG/DL (ref 0.4–1.6)
EGFRCR SERPLBLD CKD-EPI 2021: 78 ML/MIN/1.73M*2
ERYTHROCYTE [DISTWIDTH] IN BLOOD BY AUTOMATED COUNT: 12.3 % (ref 11.5–14.5)
GLUCOSE SERPL-MCNC: 98 MG/DL (ref 65–99)
HCT VFR BLD AUTO: 42.7 % (ref 41–52)
HDLC SERPL-MCNC: 32 MG/DL
HGB BLD-MCNC: 14.7 G/DL (ref 13.5–17.5)
LDLC SERPL CALC-MCNC: 49 MG/DL (ref 65–130)
MCH RBC QN AUTO: 29.6 PG (ref 26–34)
MCHC RBC AUTO-ENTMCNC: 34.4 G/DL (ref 32–36)
MCV RBC AUTO: 86 FL (ref 80–100)
NRBC BLD-RTO: 0 /100 WBCS (ref 0–0)
PLATELET # BLD AUTO: 109 X10*3/UL (ref 150–450)
POTASSIUM SERPL-SCNC: 4.8 MMOL/L (ref 3.4–5.1)
PROT SERPL-MCNC: 7.5 G/DL (ref 5.9–7.9)
RBC # BLD AUTO: 4.96 X10*6/UL (ref 4.5–5.9)
SODIUM SERPL-SCNC: 143 MMOL/L (ref 133–145)
TRIGL SERPL-MCNC: 271 MG/DL (ref 40–150)
WBC # BLD AUTO: 6.7 X10*3/UL (ref 4.4–11.3)

## 2024-04-24 PROCEDURE — 36415 COLL VENOUS BLD VENIPUNCTURE: CPT

## 2024-04-24 PROCEDURE — 1036F TOBACCO NON-USER: CPT | Performed by: ORTHOPAEDIC SURGERY

## 2024-04-24 PROCEDURE — 73560 X-RAY EXAM OF KNEE 1 OR 2: CPT | Mod: 50

## 2024-04-24 PROCEDURE — 80053 COMPREHEN METABOLIC PANEL: CPT

## 2024-04-24 PROCEDURE — 99203 OFFICE O/P NEW LOW 30 MIN: CPT | Performed by: ORTHOPAEDIC SURGERY

## 2024-04-24 PROCEDURE — 73560 X-RAY EXAM OF KNEE 1 OR 2: CPT | Mod: BILATERAL PROCEDURE | Performed by: ORTHOPAEDIC SURGERY

## 2024-04-24 PROCEDURE — 99213 OFFICE O/P EST LOW 20 MIN: CPT | Performed by: ORTHOPAEDIC SURGERY

## 2024-04-24 PROCEDURE — 85027 COMPLETE CBC AUTOMATED: CPT

## 2024-04-24 PROCEDURE — 80061 LIPID PANEL: CPT

## 2024-04-24 RX ORDER — DOXYCYCLINE 50 MG/1
TABLET ORAL
COMMUNITY
Start: 2024-03-28

## 2024-04-24 ASSESSMENT — PAIN - FUNCTIONAL ASSESSMENT: PAIN_FUNCTIONAL_ASSESSMENT: 0-10

## 2024-04-24 ASSESSMENT — PAIN SCALES - GENERAL: PAINLEVEL_OUTOF10: 5 - MODERATE PAIN

## 2024-04-24 ASSESSMENT — ENCOUNTER SYMPTOMS
DEPRESSION: 0
LOSS OF SENSATION IN FEET: 0
OCCASIONAL FEELINGS OF UNSTEADINESS: 0

## 2024-04-24 ASSESSMENT — LIFESTYLE VARIABLES: TOTAL SCORE: 0

## 2024-04-24 ASSESSMENT — PATIENT HEALTH QUESTIONNAIRE - PHQ9
SUM OF ALL RESPONSES TO PHQ9 QUESTIONS 1 AND 2: 0
1. LITTLE INTEREST OR PLEASURE IN DOING THINGS: NOT AT ALL
2. FEELING DOWN, DEPRESSED OR HOPELESS: NOT AT ALL

## 2024-04-24 ASSESSMENT — PAIN DESCRIPTION - DESCRIPTORS: DESCRIPTORS: ACHING

## 2024-04-24 NOTE — PROGRESS NOTES
Subjective      Chief Complaint   Patient presents with    Right Knee - Pain    Left Knee - Pain        No surgery found     HPI  This 58 year old patient presents today with  bilateral knee pain. The patient states that this bilateral knee pain has been present for months. The patient rates the knee pain as 7. The patient states that knee pain is worse with and aggravated by activity and bearing weight. The patient has tried ibuprofen and tylenol with no relief. Patient requests a discussion of further treatment options on examination today.     CARDIOLOGY:   Negative for chest pain, shortness of breath.   RESPIRATORY:   Negative for chest pain, shortness of breath.   MUSCULOSKELETAL:   See HPI for details.   NEUROLOGY:   Negative for tingling, numbness, weakness.    Objective    There were no vitals filed for this visit.    Knee Exam  Constitutional: Appears stated age. No apparent distress  Labored Breathing: No  Psychiatric: Normal mood and effect.   Neurological: alert and oriented x3  Skin: intact  MUSCULOSKELETAL: Neck: No tenderness. No pain or limitation with range of motion. Back: No tenderness. Straight leg test negative bilaterally. bilateral knee: There is diffuse tenderness over the knee. full range of motion McMurrays is negative. Anterior drawer and lachmans are negative in the left knee.  Anterior drawer and Lachman's test appear approximately 1+ on the right knee but with a strong endpoint.  There is not an effusion present in either the right or left knee.  The right knee stable to valgus and varus stressing.  There is some laxity with valgus-varus stressing on the left knee but with a strong endpoint.  The patient walks with a painful gait favoring the left knee while walking.    Standing AP x-rays of both knees and lateral x-rays of both knees show no evidence of osteoarthritis or bony destruction.  I reviewed these x-rays with the patient in the office today.    John was seen today for pain  and pain.  Diagnoses and all orders for this visit:  Sprain of left knee, initial encounter (Primary)  Knee pain, unspecified chronicity, unspecified laterality  -     Referral to Orthopaedic Surgery  Options are discussed with the patient in detail. The patient is instructed regarding activity modification and risk for further injury with falling or trauma, ice, physician directed at home gentle bilateral lower extremity strengthening and ROM exercises, and the appropriate use of Tylenol as needed for pain with its potential adverse reactions and side effects. The patient understands.  Return in 4 months for reevaluation or sooner as needed.  Please note that this report has been produced using speech recognition software.  It may contain errors related to grammar, punctuation or spelling.  Electronically signed, but not reviewed.    Cooper Vyas MD

## 2024-05-02 ENCOUNTER — HOSPITAL ENCOUNTER (OUTPATIENT)
Dept: RADIOLOGY | Facility: CLINIC | Age: 59
Discharge: HOME | End: 2024-05-02
Payer: COMMERCIAL

## 2024-05-02 DIAGNOSIS — I71.21 ANEURYSM OF ASCENDING AORTA WITHOUT RUPTURE (CMS-HCC): ICD-10-CM

## 2024-05-02 PROCEDURE — 71250 CT THORAX DX C-: CPT | Performed by: RADIOLOGY

## 2024-05-02 PROCEDURE — 71250 CT THORAX DX C-: CPT

## 2024-05-09 ENCOUNTER — OFFICE VISIT (OUTPATIENT)
Dept: OPHTHALMOLOGY | Facility: CLINIC | Age: 59
End: 2024-05-09
Payer: COMMERCIAL

## 2024-05-09 DIAGNOSIS — H25.813 COMBINED FORMS OF AGE-RELATED CATARACT OF BOTH EYES: Primary | ICD-10-CM

## 2024-05-09 DIAGNOSIS — H52.7 REFRACTIVE ERROR: ICD-10-CM

## 2024-05-09 PROBLEM — D69.6 THROMBOCYTOPENIA (CMS-HCC): Status: RESOLVED | Noted: 2023-08-28 | Resolved: 2024-05-09

## 2024-05-09 PROCEDURE — 92015 DETERMINE REFRACTIVE STATE: CPT | Performed by: OPHTHALMOLOGY

## 2024-05-09 PROCEDURE — 99214 OFFICE O/P EST MOD 30 MIN: CPT | Performed by: OPHTHALMOLOGY

## 2024-05-09 PROCEDURE — 99204 OFFICE O/P NEW MOD 45 MIN: CPT | Performed by: OPHTHALMOLOGY

## 2024-05-09 ASSESSMENT — VISUAL ACUITY
OS_SC: 20/20
OD_SC: 20/20
METHOD: SNELLEN - LINEAR

## 2024-05-09 ASSESSMENT — SLIT LAMP EXAM - LIDS
COMMENTS: 1+ BLEPHARITIS, 2+ DERMATOCHALASIS - UPPER LID
COMMENTS: 1+ BLEPHARITIS, 2+ DERMATOCHALASIS - UPPER LID

## 2024-05-09 ASSESSMENT — ENCOUNTER SYMPTOMS
CARDIOVASCULAR NEGATIVE: 0
CONSTITUTIONAL NEGATIVE: 0
MUSCULOSKELETAL NEGATIVE: 0
OCCASIONAL FEELINGS OF UNSTEADINESS: 0
DEPRESSION: 0
HEMATOLOGIC/LYMPHATIC NEGATIVE: 0
LOSS OF SENSATION IN FEET: 0
ALLERGIC/IMMUNOLOGIC NEGATIVE: 0
PSYCHIATRIC NEGATIVE: 0
ENDOCRINE NEGATIVE: 0
GASTROINTESTINAL NEGATIVE: 0
RESPIRATORY NEGATIVE: 0
NEUROLOGICAL NEGATIVE: 0
EYES NEGATIVE: 0

## 2024-05-09 ASSESSMENT — KERATOMETRY
OS_K1POWER_DIOPTERS: 44.25
OS_AXISANGLE2_DEGREES: 170
OD_K2POWER_DIOPTERS: 44.50
OD_AXISANGLE2_DEGREES: 180
OD_AXISANGLE_DEGREES: 90
OD_K1POWER_DIOPTERS: 44.50
OS_K2POWER_DIOPTERS: 45.00
OS_AXISANGLE_DEGREES: 80

## 2024-05-09 ASSESSMENT — PAIN SCALES - GENERAL: PAINLEVEL: 0-NO PAIN

## 2024-05-09 ASSESSMENT — TONOMETRY
OD_IOP_MMHG: 16
IOP_METHOD: GOLDMANN APPLANATION
OS_IOP_MMHG: 16

## 2024-05-09 ASSESSMENT — CUP TO DISC RATIO
OS_RATIO: 0.45
OD_RATIO: 0.45

## 2024-05-09 ASSESSMENT — PATIENT HEALTH QUESTIONNAIRE - PHQ9
2. FEELING DOWN, DEPRESSED OR HOPELESS: NOT AT ALL
1. LITTLE INTEREST OR PLEASURE IN DOING THINGS: NOT AT ALL
SUM OF ALL RESPONSES TO PHQ9 QUESTIONS 1 AND 2: 0

## 2024-05-09 ASSESSMENT — EXTERNAL EXAM - LEFT EYE: OS_EXAM: BROW PTOSIS

## 2024-05-09 ASSESSMENT — EXTERNAL EXAM - RIGHT EYE: OD_EXAM: BROW PTOSIS

## 2024-05-09 NOTE — PROGRESS NOTES
Subjective   Patient ID: John Johnson is a 58 y.o. male.    Chief Complaint    New Patient Visit; Blurred Vision; Dry Eye       HPI       Blurred Vision    Context:  near vision.             Comments    HALLEY X 10 YEARS. PREVIOUS TX FOR POSSIBLE STYES     Complete exam.  No new changes in health history or meds.  Vision is good and stable.  Just uses readers.  No complaints or problems.            Last edited by Cheng Howell MD on 5/9/2024  3:06 PM.        No current outpatient medications on file. (Ophthalmology pharm classes)       Current Outpatient Medications (Other)   Medication Sig Dispense Refill    allopurinol (Zyloprim) 100 mg tablet Take 1 tablet (100 mg) by mouth once daily. 90 tablet 1    atorvastatin (Lipitor) 10 mg tablet Take 1 tablet (10 mg) by mouth once daily.      cholecalciferol (Vitamin D3) 5,000 Units tablet Take 1 tablet (5,000 Units) by mouth once daily.      cyanocobalamin (Vitamin B-12) 100 mcg tablet Take 1 tablet (100 mcg) by mouth once daily.      doxycycline (Adoxa) 50 mg tablet       doxycycline (Vibramycin) 50 mg capsule Take 1 capsule (50 mg) by mouth once daily. Take with at least 8 ounces (large glass) of water, do not lie down for 30 minutes after      fenofibrate micronized (Lofibra) 67 mg capsule Take 1 capsule (67 mg) by mouth once daily with a meal. 90 capsule 3    fsh/flx/prim/cur/bor/om3,6,9 5 (OMEGA 3-6-9 FATTY ACIDS ORAL) Take 2 tablets by mouth 2 times a day.      glucosamine-chondroitin 500-400 mg capsule Take by mouth.         Objective   Base Eye Exam       Visual Acuity (Snellen - Linear)         Right Left Both    Dist sc 20/20 20/20     Near sc   J1              Tonometry (Goldmann Applanation, 1:57 PM)         Right Left    Pressure 16 16              Pupils         Dark Shape React APD    Right 4 Round 2 None    Left 4 Round 2 None              Extraocular Movement         Right Left     Full Full              Dilation       Both eyes: 1% Tropic 2.5% Phen @  1:57 PM                  Additional Tests       Keratometry         K1 Axis K2 Axis    Right 44.50 180 44.50 90    Left 44.25 170 45.00 80                  Slit Lamp and Fundus Exam       External Exam         Right Left    External Brow ptosis Brow ptosis              Slit Lamp Exam         Right Left    Lids/Lashes 1+ Blepharitis, 2+ Dermatochalasis - upper lid 1+ Blepharitis, 2+ Dermatochalasis - upper lid    Conjunctiva/Sclera White and quiet White and quiet    Cornea Clear Clear    Anterior Chamber Deep and quiet Deep and quiet    Iris Round and reactive Round and reactive    Lens Trace Nuclear sclerosis Trace Nuclear sclerosis    Anterior Vitreous Vitreous syneresis Vitreous syneresis              Fundus Exam         Right Left    Disc Normal Normal    C/D Ratio 0.45 0.45    Macula Normal Normal    Vessels Normal Normal    Periphery Normal Normal                  Refraction       Wearing Rx       Type: NONE              Final Rx         Sphere Cylinder [place] Dist VA Add Near VA    Right +0.00 -0.25 105 20/20 +2.50 20/20    Left +0.00 -0.25 105 20/20 +2.50 20/20      Expiration Date: 5/9/2026    Pupillary Distance: 67                    Assessment/Plan   Problem List Items Addressed This Visit          Eye/Vision problems    Combined forms of age-related cataract of both eyes - Primary     F/u 2 years full.           Refractive error     Readers only.

## 2024-05-20 ENCOUNTER — OFFICE VISIT (OUTPATIENT)
Dept: CARDIOLOGY | Facility: CLINIC | Age: 59
End: 2024-05-20
Payer: COMMERCIAL

## 2024-05-20 VITALS
WEIGHT: 221 LBS | HEART RATE: 64 BPM | BODY MASS INDEX: 31.71 KG/M2 | DIASTOLIC BLOOD PRESSURE: 80 MMHG | SYSTOLIC BLOOD PRESSURE: 124 MMHG

## 2024-05-20 DIAGNOSIS — R93.1 ELEVATED CORONARY ARTERY CALCIUM SCORE: ICD-10-CM

## 2024-05-20 DIAGNOSIS — I71.21 ANEURYSM OF ASCENDING AORTA WITHOUT RUPTURE (CMS-HCC): Primary | ICD-10-CM

## 2024-05-20 DIAGNOSIS — E78.1 HYPERTRIGLYCERIDEMIA: ICD-10-CM

## 2024-05-20 PROCEDURE — 99213 OFFICE O/P EST LOW 20 MIN: CPT | Performed by: INTERNAL MEDICINE

## 2024-05-20 PROCEDURE — 1036F TOBACCO NON-USER: CPT | Performed by: INTERNAL MEDICINE

## 2024-05-20 ASSESSMENT — COLUMBIA-SUICIDE SEVERITY RATING SCALE - C-SSRS
2. HAVE YOU ACTUALLY HAD ANY THOUGHTS OF KILLING YOURSELF?: NO
6. HAVE YOU EVER DONE ANYTHING, STARTED TO DO ANYTHING, OR PREPARED TO DO ANYTHING TO END YOUR LIFE?: NO
1. IN THE PAST MONTH, HAVE YOU WISHED YOU WERE DEAD OR WISHED YOU COULD GO TO SLEEP AND NOT WAKE UP?: NO

## 2024-05-20 ASSESSMENT — ENCOUNTER SYMPTOMS
PALPITATIONS: 0
DYSPNEA ON EXERTION: 0
PARESTHESIAS: 0
DYSURIA: 0
ABDOMINAL PAIN: 0
OCCASIONAL FEELINGS OF UNSTEADINESS: 0
COUGH: 0
BLURRED VISION: 0
NUMBNESS: 0
DEPRESSION: 0
HEMATURIA: 0
LOSS OF SENSATION IN FEET: 0
SHORTNESS OF BREATH: 0

## 2024-05-20 ASSESSMENT — PAIN SCALES - GENERAL: PAINLEVEL: 0-NO PAIN

## 2024-05-20 NOTE — ASSESSMENT & PLAN NOTE
Cardiac wise stable with no anginal type symptoms.  Continue standard risk factor modification and will follow on a clinical basis.

## 2024-05-20 NOTE — PROGRESS NOTES
Subjective   John Johnson is a 58 y.o. male.    Chief Complaint:  Follow-up (Ct scan)    HPI  Overall patient feels very well.  No chest pain, palpitations or unusual shortness of breath.  Does stay active.    Review of Systems   Constitutional: Negative for malaise/fatigue.   HENT:  Negative for congestion.    Eyes:  Negative for blurred vision.   Cardiovascular:  Negative for chest pain, dyspnea on exertion and palpitations.   Respiratory:  Negative for cough and shortness of breath.    Musculoskeletal:  Negative for joint pain.   Gastrointestinal:  Negative for abdominal pain.   Genitourinary:  Negative for dysuria and hematuria.   Neurological:  Negative for numbness and paresthesias.       Objective   Constitutional:       Appearance: Not in distress.   Eyes:      Conjunctiva/sclera: Conjunctivae normal.   Neck:      Vascular: JVD normal.   Pulmonary:      Breath sounds: Normal breath sounds. No wheezing. No rhonchi. No rales.   Cardiovascular:      Normal rate. Regular rhythm.      Murmurs: There is no murmur.      No gallop.  No click. No rub.   Abdominal:      Palpations: Abdomen is soft.   Neurological:      General: No focal deficit present.      Mental Status: Alert.         Lab Review:   Lab on 04/24/2024   Component Date Value    Cholesterol 04/24/2024 135     HDL-Cholesterol 04/24/2024 32.0 (L)     Cholesterol/HDL Ratio 04/24/2024 4.2     LDL Calculated 04/24/2024 49 (L)     Triglycerides 04/24/2024 271 (H)     Glucose 04/24/2024 98     Sodium 04/24/2024 143     Potassium 04/24/2024 4.8     Chloride 04/24/2024 104     Bicarbonate 04/24/2024 24     Urea Nitrogen 04/24/2024 18     Creatinine 04/24/2024 1.10     eGFR 04/24/2024 78     Calcium 04/24/2024 9.7     Albumin 04/24/2024 5.0     Alkaline Phosphatase 04/24/2024 54     Total Protein 04/24/2024 7.5     AST 04/24/2024 28     Bilirubin, Total 04/24/2024 0.9     ALT 04/24/2024 44 (H)     Anion Gap 04/24/2024 15     WBC 04/24/2024 6.7     nRBC  04/24/2024 0.0     RBC 04/24/2024 4.96     Hemoglobin 04/24/2024 14.7     Hematocrit 04/24/2024 42.7     MCV 04/24/2024 86     MCH 04/24/2024 29.6     MCHC 04/24/2024 34.4     RDW 04/24/2024 12.3     Platelets 04/24/2024 109 (L)        Assessment/Plan   The primary encounter diagnosis was Aneurysm of ascending aorta without rupture (CMS-HCC). Diagnoses of Elevated coronary artery calcium score and Hypertriglyceridemia were also pertinent to this visit.    Hypertriglyceridemia  Dr. Mcfarland checked labs last month:  Tchol 135  HDL 32 LDL 49.  We discussed low carbohydrate diet and physical exercise to reduce triglyceride levels.  Will continue the fenofibrate as well.    Aneurysm of ascending aorta without rupture (CMS-HCC)  Diameter of the ascending aorta continues to decrease, and I have no good explanation why the diameter would be less.  At this point we will plan on repeating CT scan approximately May 2026 to see if there is any change.  But again I do not have a good explanation why the size of the aorta will be getting smaller instead of larger.    Elevated coronary artery calcium score  Cardiac wise stable with no anginal type symptoms.  Continue standard risk factor modification and will follow on a clinical basis.

## 2024-05-20 NOTE — ASSESSMENT & PLAN NOTE
Dr. Mcfarland checked labs last month:  Tchol 135  HDL 32 LDL 49.  We discussed low carbohydrate diet and physical exercise to reduce triglyceride levels.  Will continue the fenofibrate as well.

## 2024-05-20 NOTE — ASSESSMENT & PLAN NOTE
Diameter of the ascending aorta continues to decrease, and I have no good explanation why the diameter would be less.  At this point we will plan on repeating CT scan approximately May 2026 to see if there is any change.  But again I do not have a good explanation why the size of the aorta will be getting smaller instead of larger.

## 2024-06-01 ASSESSMENT — PROMIS GLOBAL HEALTH SCALE
RATE_GENERAL_HEALTH: EXCELLENT
EMOTIONAL_PROBLEMS: NEVER
CARRYOUT_PHYSICAL_ACTIVITIES: COMPLETELY
CARRYOUT_SOCIAL_ACTIVITIES: EXCELLENT
RATE_QUALITY_OF_LIFE: EXCELLENT
RATE_AVERAGE_PAIN: 0
RATE_PHYSICAL_HEALTH: VERY GOOD
RATE_SOCIAL_SATISFACTION: EXCELLENT
RATE_MENTAL_HEALTH: EXCELLENT

## 2024-06-03 ENCOUNTER — OFFICE VISIT (OUTPATIENT)
Dept: PRIMARY CARE | Facility: CLINIC | Age: 59
End: 2024-06-03
Payer: COMMERCIAL

## 2024-06-03 ENCOUNTER — APPOINTMENT (OUTPATIENT)
Dept: PRIMARY CARE | Facility: CLINIC | Age: 59
End: 2024-06-03
Payer: COMMERCIAL

## 2024-06-03 VITALS
TEMPERATURE: 98.8 F | HEIGHT: 70 IN | HEART RATE: 68 BPM | WEIGHT: 221.8 LBS | SYSTOLIC BLOOD PRESSURE: 130 MMHG | DIASTOLIC BLOOD PRESSURE: 84 MMHG | OXYGEN SATURATION: 96 % | BODY MASS INDEX: 31.75 KG/M2

## 2024-06-03 DIAGNOSIS — Z12.11 COLON CANCER SCREENING: ICD-10-CM

## 2024-06-03 DIAGNOSIS — E78.1 HYPERTRIGLYCERIDEMIA: ICD-10-CM

## 2024-06-03 DIAGNOSIS — Z00.00 ROUTINE ADULT HEALTH MAINTENANCE: Primary | ICD-10-CM

## 2024-06-03 DIAGNOSIS — M1A.9XX0 CHRONIC GOUT WITHOUT TOPHUS, UNSPECIFIED CAUSE, UNSPECIFIED SITE: ICD-10-CM

## 2024-06-03 DIAGNOSIS — E55.9 VITAMIN D DEFICIENCY: ICD-10-CM

## 2024-06-03 DIAGNOSIS — D69.6 THROMBOCYTOPENIA (CMS-HCC): ICD-10-CM

## 2024-06-03 DIAGNOSIS — K70.0 ALCOHOLIC FATTY LIVER: ICD-10-CM

## 2024-06-03 DIAGNOSIS — Z12.5 PROSTATE CANCER SCREENING: ICD-10-CM

## 2024-06-03 PROBLEM — I71.21 ANEURYSM OF ASCENDING AORTA WITHOUT RUPTURE (CMS-HCC): Status: RESOLVED | Noted: 2023-08-28 | Resolved: 2024-06-03

## 2024-06-03 PROCEDURE — 99396 PREV VISIT EST AGE 40-64: CPT | Performed by: FAMILY MEDICINE

## 2024-06-03 ASSESSMENT — PAIN SCALES - GENERAL: PAINLEVEL: 0-NO PAIN

## 2024-06-03 ASSESSMENT — PATIENT HEALTH QUESTIONNAIRE - PHQ9
SUM OF ALL RESPONSES TO PHQ9 QUESTIONS 1 AND 2: 0
2. FEELING DOWN, DEPRESSED OR HOPELESS: NOT AT ALL
1. LITTLE INTEREST OR PLEASURE IN DOING THINGS: NOT AT ALL

## 2024-06-03 NOTE — ASSESSMENT & PLAN NOTE
- Will attempt to obtain prior colonoscopy report  -Continue with repeat surveillance in 10 years per specialist recommendation   Detail Level: Simple

## 2024-06-03 NOTE — ASSESSMENT & PLAN NOTE
- Platelet count stable to baseline on most recent blood work  -Will plan to monitor with routine blood work to be completed in 6 months

## 2024-06-03 NOTE — PATIENT INSTRUCTIONS
Problem List Items Addressed This Visit             ICD-10-CM    Chronic gout without tophus M1A.9XX0    Relevant Orders    Uric acid    Vitamin D deficiency E55.9     - Vitamin D level stable on blood work completed in December         Thrombocytopenia (CMS-HCC) D69.6     - Platelet count stable to baseline on most recent blood work  -Will plan to monitor with routine blood work to be completed in 6 months         Relevant Orders    CBC    Alcoholic fatty liver K70.0     - Liver enzyme level stable on most recent blood work         Relevant Orders    TSH with reflex to Free T4 if abnormal    Lipid Panel    Comprehensive Metabolic Panel    Hypertriglyceridemia E78.1     - Continue to focus on healthy, low-fat diet with moderation of carbohydrates and compliance with atorvastatin  -Continue with routine cardiology follow-up per protocol         Relevant Orders    TSH with reflex to Free T4 if abnormal    Lipid Panel    Comprehensive Metabolic Panel    Colon cancer screening Z12.11     - Will attempt to obtain prior colonoscopy report  -Continue with repeat surveillance in 10 years per specialist recommendation          Other Visit Diagnoses         Codes    Routine adult health maintenance    -  Primary Z00.00    Relevant Orders    TSH with reflex to Free T4 if abnormal    Lipid Panel    Comprehensive Metabolic Panel    CBC    Prostate cancer screening     Z12.5    Relevant Orders    Prostate Spec.Ag,Screen            Additional Visit Plans:  - Complete history and physical examination was performed    GENERAL RECOMMENDATIONS:  - Complete review of history of physical exam completed today  - A healthy diet to maintain a normal BMI (under 25) to reduce heart disease, risk for diabetes encouraged.  - Exercising 150 minutes per week and eating healthy to reduce heart disease.  - Blood pressure screen completed.    BLOOD TESTING:  - Orders for fasting routine blood work given today, to be completed at your earliest  convenience  - Will contact you with the blood work results once received and reviewed.    General Recommendations include:  - Cholesterol and diabetes screen if risk factors (overweight, high blood pressure).  - Sexually transmitted infections if risk factors.      VACCINATIONS RECOMMENDATIONS:  - Flu shot annually - advocated seasonally  - Tetanus booster every 10 years - up-to-date  - Pneumonia vaccination starting at 65 years old (or earlier if risk factors - smoker, diabetic, heart or lung conditions) -not due yet  - Shingles vaccine for those 50 years or older - check with your insurance for SHINGRIX coverage and get it at your local pharmacy -up-to-date  -COVID-19 vaccine series completed     SCREENINGS RECOMMENDATIONS:  -Colon cancer screening (with colonoscopy or Cologuard) for men and women starting at age 45 until 74 years old - up-to-date    Counseling:       Medication education:         Education:  The patient is counseled regarding potential side-effects of all new medications        Understanding:  Patient expressed understanding        Adherence:  No barriers to adherence identified      ** Please excuse any errors in grammar or translation related to this dictation. Voice recognition software was utilized to prepare this document. **

## 2024-06-03 NOTE — ASSESSMENT & PLAN NOTE
- Continue to focus on healthy, low-fat diet with moderation of carbohydrates and compliance with atorvastatin  -Continue with routine cardiology follow-up per protocol

## 2024-06-03 NOTE — PROGRESS NOTES
Outpatient Visit Note    Chief Complaint   Patient presents with    Annual Exam       HPI:  John Johnson is a 58 y.o. male with a past medical history significant for alcoholic fatty liver disease, hypertension with known aortic aneurysm followed by Dr. Tompkins of Cardiology, hyperuricemia, thrombocytopenia, elevated triglyceride levels and vitamin-D/B12 deficiency who presents to the office for annual well exam.  He was last seen in the office on 12/14/2023 secondary to penile irritation.    He was previously established with established at [facility], prior to establishing approximately 1 year ago.           Of note, patient had identified pulmonary nodule with repeat chest CT on 04/19/2023 which showed resolution. CT did incidentally note splenomegaly to which clinical correlation was recommended. Patient denies any abdominal symptoms, fatigue or recent illnesses.    Patient recently completed blood work on 4/24/2024 including CBC, CMP and lipid panel.  Blood work was remarkable for thrombocytopenia stable to patient's baseline with mildly elevated ALT level and elevated triglycerides though overall improved from prior levels.  He had additional panel completed on 11/2/2023 including CBC, CMP, lipid panel, TSH, uric acid level, vitamin D D, PSA and B12.  Blood work was remarkable for stable thrombocytopenia and continued hypertriglyceridemia.    Well Exam:  Overall, they describe their health as good with no reports of recent illness or hospitalization. They state that their diet is stable with no expressed weight concerns. In regards to physical activity, he stays active in his day-to-day. Denies any significant sleep complaints. Denies issues of chest pain, shortness of breath, headaches, vision/hearing changes, abdominal pain, vomiting, diarrhea, melena, hematochezia, constipation or urinary symptoms.    Preventative Health Maintenance:  In regards to preventative health maintenance,  last Tdap received in 2019. Flu shot received for past season. Pneumonia vaccination series not previously initiated. In regards to CRC screening, did successfully complete colonoscopy in 2023 which was unremarkable having had previous colonoscopy in 2013. Shingles vaccination completed.  COVID-19 vaccine series completed with patient currently up-to-date with most recent booster.    Hypertension/aortic aneurysm:  Patient had routine follow-up with Cardiology on 11/6/2023 with no acute changes at that time. Blood pressure had remained stable. Patient is currently set for routine 6 month follow-up. Denies any chest pain, shortness a breath, lightheadedness or dizziness.           Fatty liver:  Liver enzyme levels stable with most recent blood work. In regards to GI follow-up, states to have been seen by GI in the past with liver biopsy completed though no active specialist he is following with at this time. Patient was noted to have colonoscopy in August 2013. Has remained compliant with atorvastatin and fenofibrate regimen. Last lipid panel completed approximately 1 year ago which was stable outside of continued hypertriglyceridemia.           Gout:  Has remain compliant with allopurinol regimen denying any recent gout flares. States to have had 1 flare when he was ultimately placed on allopurinol regimen. Questions if he needs to be a medication though denies any adverse side effects and is happy that he has not had any recurrent attack. Has attempted to modify his dietary habits. Does drink beer regularly, though has attempted to cut back secondary to liver issues.  At previous encounter he admitted to having reduced allopurinol with no subsequent gout flares.  Uric acid level was on high normal range at 7.1 on last check           Vitamin D/B12 deficiency:  Admits to compliance with daily vitamin supplementation with level stable on most recent blood work    Current Medications  Current Outpatient Medications    Medication Instructions    allopurinol (ZYLOPRIM) 100 mg, oral, Daily    atorvastatin (Lipitor) 10 mg tablet 1 tablet, oral, Daily    cholecalciferol (Vitamin D3) 5,000 Units tablet 1 tablet, oral, Daily    cyanocobalamin (Vitamin B-12) 100 mcg tablet 1 tablet, oral, Daily    doxycycline (Adoxa) 50 mg tablet     fenofibrate micronized (LOFIBRA) 67 mg, oral, Daily with breakfast    fsh/flx/prim/cur/bor/om3,6,9 5 (OMEGA 3-6-9 FATTY ACIDS ORAL) 2 tablets, oral, 2 times daily    glucosamine-chondroitin 500-400 mg capsule oral        Allergies  No Known Allergies     Immunizations  Immunization History   Administered Date(s) Administered    Flu vaccine (IIV4), preservative free *Check age/dose* 10/10/2018, 09/22/2023    Flu vaccine, quadrivalent, no egg protein, age 6 month or greater (FLUCELVAX) 10/11/2022    Influenza, Seasonal, Quadrivalent, Adjuvanted 11/09/2021    Influenza, injectable, MDCK, quadrivalent 09/21/2017, 10/03/2018, 10/16/2018, 10/01/2019    Influenza, seasonal, injectable 10/26/2012, 09/04/2014, 09/21/2017    Influenza, trivalent, adjuvanted 11/09/2021    Pfizer COVID-19 vaccine, Fall 2023, 12 years and older, (30mcg/0.3mL) 09/22/2023    Pfizer COVID-19 vaccine, bivalent, age 12 years and older (30 mcg/0.3 mL) 10/11/2022    Pfizer Gray Cap SARS-CoV-2 05/26/2022    Pfizer Purple Cap SARS-CoV-2 03/22/2021, 04/19/2021, 11/30/2021    Tdap vaccine, age 7 year and older (BOOSTRIX, ADACEL) 03/27/2019    Zoster vaccine, recombinant, adult (SHINGRIX) 01/04/2021, 06/07/2021, 06/19/2021        Past Medical History:   Diagnosis Date    Bilateral knee pain     Coronary artery disease     Dry eyes On and off for the last year at night.    Fatty liver due to alcoholism     Heart murmur     Hypertension     Hyperuricemia     Rosacea     Thrombocytopenia (CMS-HCC)     TIA (transient ischemic attack)       Past Surgical History:   Procedure Laterality Date    TEAR DUCT SURGERY  15 plus years ago    US GUIDED  PERCUTANEOUS PLACEMENT  04/30/2018    US GUIDED PERCUTANEOUS PLACEMENT [facility]     Family History   Problem Relation Name Age of Onset    Macular degeneration Mother Nicolette Upton     Cancer Father Yash Johnson     Brain cancer Father Yash Johnson     Other (brain tumor) Father Yash Johnson      Social History     Tobacco Use    Smoking status: Never    Smokeless tobacco: Never   Vaping Use    Vaping status: Never Used   Substance Use Topics    Alcohol use: Yes     Alcohol/week: 12.0 standard drinks of alcohol     Types: 12 Cans of beer per week     Comment: 4 days a week    Drug use: Never       ROS  All pertinent positive symptoms are included in the history of present illness.  All other systems have been reviewed and are negative and noncontributory to this patient's current ailments.      VITAL SIGNS  Vitals:    06/03/24 1438   BP: 130/84   Pulse: 68   Temp: 37.1 °C (98.8 °F)   SpO2: 96%       PHYSICAL EXAM  GENERAL APPEARANCE: alert and oriented, Pleasant and cooperative, No Acute Distress.   HEENT: EOMI, PERRLA, TMs intact and flat bilaterally, patent nares, normal oropharynx, MMM  NECK: no lymphadenopathy, no thyromegaly.   HEART: RRR, normal S1S2, no murmurs, click or rubs.   LUNGS: clear to auscultation bilaterally, no wheezes/rhonchi/rales.   ABDOMEN: soft, non-tender, no organomegaly, no masses palpated, no guarding or rigidity.   EXTREMITIES: no edema, normal ROM  SKIN: normal, no rash, unremarkable.   NEUROLOGIC EXAM: non-focal exam.   MUSCULOSKELETAL: no gross abnormalities.   PSYCH: affect is normal, eye contact is good.     Assessment/Plan   Problem List Items Addressed This Visit             ICD-10-CM    Chronic gout without tophus M1A.9XX0    Relevant Orders    Uric acid    Vitamin D deficiency E55.9     - Vitamin D level stable on blood work completed in December         Thrombocytopenia (CMS-[facility]) D69.6     - Platelet count stable to baseline on most recent blood work  -Will plan  to monitor with routine blood work to be completed in 6 months         Relevant Orders    CBC    Alcoholic fatty liver K70.0     - Liver enzyme level stable on most recent blood work         Relevant Orders    TSH with reflex to Free T4 if abnormal    Lipid Panel    Comprehensive Metabolic Panel    Hypertriglyceridemia E78.1     - Continue to focus on healthy, low-fat diet with moderation of carbohydrates and compliance with atorvastatin  -Continue with routine cardiology follow-up per protocol         Relevant Orders    TSH with reflex to Free T4 if abnormal    Lipid Panel    Comprehensive Metabolic Panel    Colon cancer screening Z12.11     - Will attempt to obtain prior colonoscopy report  -Continue with repeat surveillance in 10 years per specialist recommendation          Other Visit Diagnoses         Codes    Routine adult health maintenance    -  Primary Z00.00    Relevant Orders    TSH with reflex to Free T4 if abnormal    Lipid Panel    Comprehensive Metabolic Panel    CBC    Prostate cancer screening     Z12.5    Relevant Orders    Prostate Spec.Ag,Screen            Additional Visit Plans:  - Complete history and physical examination was performed    GENERAL RECOMMENDATIONS:  - Complete review of history of physical exam completed today  - A healthy diet to maintain a normal BMI (under 25) to reduce heart disease, risk for diabetes encouraged.  - Exercising 150 minutes per week and eating healthy to reduce heart disease.  - Blood pressure screen completed.    BLOOD TESTING:  - Orders for fasting routine blood work given today, to be completed at your earliest convenience  - Will contact you with the blood work results once received and reviewed.    General Recommendations include:  - Cholesterol and diabetes screen if risk factors (overweight, high blood pressure).  - Sexually transmitted infections if risk factors.      VACCINATIONS RECOMMENDATIONS:  - Flu shot annually - advocated seasonally  - Tetanus  booster every 10 years - up-to-date  - Pneumonia vaccination starting at 65 years old (or earlier if risk factors - smoker, diabetic, heart or lung conditions) -not due yet  - Shingles vaccine for those 50 years or older - check with your insurance for SHINGRIX coverage and get it at your local pharmacy -up-to-date  -COVID-19 vaccine series completed     SCREENINGS RECOMMENDATIONS:  -Colon cancer screening (with colonoscopy or Cologuard) for men and women starting at age 45 until 74 years old - up-to-date    Counseling:       Medication education:         Education:  The patient is counseled regarding potential side-effects of all new medications        Understanding:  Patient expressed understanding        Adherence:  No barriers to adherence identified      ** Please excuse any errors in grammar or translation related to this dictation. Voice recognition software was utilized to prepare this document. **

## 2024-06-06 ENCOUNTER — PATIENT MESSAGE (OUTPATIENT)
Dept: PRIMARY CARE | Facility: CLINIC | Age: 59
End: 2024-06-06
Payer: COMMERCIAL

## 2024-06-06 RX ORDER — ALLOPURINOL 100 MG/1
100 TABLET ORAL DAILY
Qty: 90 TABLET | Refills: 3 | Status: SHIPPED | OUTPATIENT
Start: 2024-06-06

## 2024-06-07 ENCOUNTER — TELEPHONE (OUTPATIENT)
Dept: CARDIOLOGY | Facility: CLINIC | Age: 59
End: 2024-06-07
Payer: COMMERCIAL

## 2024-06-07 DIAGNOSIS — R93.1 ELEVATED CORONARY ARTERY CALCIUM SCORE: Primary | ICD-10-CM

## 2024-06-07 RX ORDER — ATORVASTATIN CALCIUM 10 MG/1
10 TABLET, FILM COATED ORAL DAILY
Qty: 90 TABLET | Refills: 3 | Status: SHIPPED | OUTPATIENT
Start: 2024-06-07 | End: 2025-06-07

## 2024-06-07 NOTE — TELEPHONE ENCOUNTER
----- Message from John Johnson sent at 6/6/2024  8:34 PM EDT -----  Regarding: Refill script.  Contact: [phone]  I just received my last script for Atorvastatin Tabs. Could you send refills to [facility]?  Thank you. Milind

## 2024-11-06 ENCOUNTER — E-VISIT (OUTPATIENT)
Dept: PRIMARY CARE | Facility: CLINIC | Age: 59
End: 2024-11-06
Payer: COMMERCIAL

## 2024-11-06 ENCOUNTER — PATIENT MESSAGE (OUTPATIENT)
Dept: PRIMARY CARE | Facility: CLINIC | Age: 59
End: 2024-11-06

## 2024-11-06 DIAGNOSIS — E78.1 HYPERTRIGLYCERIDEMIA: ICD-10-CM

## 2024-11-07 RX ORDER — FENOFIBRATE 67 MG/1
67 CAPSULE ORAL
Qty: 90 CAPSULE | Refills: 3 | Status: SHIPPED | OUTPATIENT
Start: 2024-11-07 | End: 2025-11-07

## 2024-11-07 RX ORDER — FENOFIBRATE 67 MG/1
67 CAPSULE ORAL
Qty: 90 CAPSULE | Refills: 3 | Status: SHIPPED | OUTPATIENT
Start: 2024-11-07 | End: 2024-11-07

## 2025-04-07 ENCOUNTER — PATIENT MESSAGE (OUTPATIENT)
Facility: CLINIC | Age: 60
End: 2025-04-07
Payer: COMMERCIAL

## 2025-04-17 LAB
ALBUMIN SERPL-MCNC: 4.9 G/DL (ref 3.6–5.1)
ALP SERPL-CCNC: 41 U/L (ref 35–144)
ALT SERPL-CCNC: 68 U/L (ref 9–46)
ANION GAP SERPL CALCULATED.4IONS-SCNC: 14 MMOL/L (CALC) (ref 7–17)
AST SERPL-CCNC: 39 U/L (ref 10–35)
BILIRUB SERPL-MCNC: 0.9 MG/DL (ref 0.2–1.2)
BUN SERPL-MCNC: 15 MG/DL (ref 7–25)
CALCIUM SERPL-MCNC: 9.5 MG/DL (ref 8.6–10.3)
CHLORIDE SERPL-SCNC: 103 MMOL/L (ref 98–110)
CHOLEST SERPL-MCNC: 134 MG/DL
CHOLEST/HDLC SERPL: 3.9 (CALC)
CO2 SERPL-SCNC: 21 MMOL/L (ref 20–32)
CREAT SERPL-MCNC: 1.07 MG/DL (ref 0.7–1.3)
EGFRCR SERPLBLD CKD-EPI 2021: 80 ML/MIN/1.73M2
ERYTHROCYTE [DISTWIDTH] IN BLOOD BY AUTOMATED COUNT: 13.5 % (ref 11–15)
GLUCOSE SERPL-MCNC: 119 MG/DL (ref 65–99)
HCT VFR BLD AUTO: 43.8 % (ref 38.5–50)
HDLC SERPL-MCNC: 34 MG/DL
HGB BLD-MCNC: 15.1 G/DL (ref 13.2–17.1)
LDLC SERPL CALC-MCNC: 59 MG/DL (CALC)
MCH RBC QN AUTO: 30.2 PG (ref 27–33)
MCHC RBC AUTO-ENTMCNC: 34.5 G/DL (ref 32–36)
MCV RBC AUTO: 87.6 FL (ref 80–100)
NONHDLC SERPL-MCNC: 100 MG/DL (CALC)
PLATELET # BLD AUTO: 112 THOUSAND/UL (ref 140–400)
PMV BLD REES-ECKER: 10.5 FL (ref 7.5–12.5)
POTASSIUM SERPL-SCNC: 4.3 MMOL/L (ref 3.5–5.3)
PROT SERPL-MCNC: 7.1 G/DL (ref 6.1–8.1)
RBC # BLD AUTO: 5 MILLION/UL (ref 4.2–5.8)
SODIUM SERPL-SCNC: 138 MMOL/L (ref 135–146)
TRIGL SERPL-MCNC: 343 MG/DL
TSH SERPL-ACNC: 3.66 MIU/L (ref 0.4–4.5)
URATE SERPL-MCNC: 6.8 MG/DL (ref 4–8)
WBC # BLD AUTO: 5.6 THOUSAND/UL (ref 3.8–10.8)

## 2025-04-18 DIAGNOSIS — R73.09 ELEVATED GLUCOSE: Primary | ICD-10-CM

## 2025-04-18 LAB — PSA SERPL-MCNC: 2.59 NG/ML

## 2025-04-18 NOTE — ASSESSMENT & PLAN NOTE
Dr. Mcfarland recently checked a fasting lipid panel: Total cholesterol is 134, triglycerides 343, HDL 34 and LDL 53.  We discussed low carbohydrate diet exercise and alcohol reduction as a means of lowering triglycerides.  He states his diet was very poor over the last couple months and will do better now.  Will recheck a fasting lipid panel on return visit.

## 2025-04-21 ENCOUNTER — OFFICE VISIT (OUTPATIENT)
Facility: CLINIC | Age: 60
End: 2025-04-21
Payer: COMMERCIAL

## 2025-04-21 VITALS
DIASTOLIC BLOOD PRESSURE: 70 MMHG | BODY MASS INDEX: 32 KG/M2 | OXYGEN SATURATION: 99 % | SYSTOLIC BLOOD PRESSURE: 120 MMHG | HEART RATE: 73 BPM | WEIGHT: 223 LBS

## 2025-04-21 DIAGNOSIS — E78.1 HYPERTRIGLYCERIDEMIA: ICD-10-CM

## 2025-04-21 DIAGNOSIS — R93.1 ELEVATED CORONARY ARTERY CALCIUM SCORE: Primary | ICD-10-CM

## 2025-04-21 PROCEDURE — 1036F TOBACCO NON-USER: CPT | Performed by: INTERNAL MEDICINE

## 2025-04-21 PROCEDURE — 99213 OFFICE O/P EST LOW 20 MIN: CPT | Performed by: INTERNAL MEDICINE

## 2025-04-21 ASSESSMENT — ENCOUNTER SYMPTOMS
NUMBNESS: 0
SHORTNESS OF BREATH: 0
PALPITATIONS: 0
ABDOMINAL PAIN: 0
DEPRESSION: 0
DYSURIA: 0
BLURRED VISION: 0
OCCASIONAL FEELINGS OF UNSTEADINESS: 0
COUGH: 0
HEMATURIA: 0
DYSPNEA ON EXERTION: 0
PARESTHESIAS: 0
LOSS OF SENSATION IN FEET: 0

## 2025-04-21 ASSESSMENT — LIFESTYLE VARIABLES: TOTAL SCORE: 0

## 2025-04-21 ASSESSMENT — PAIN SCALES - GENERAL: PAINLEVEL_OUTOF10: 0-NO PAIN

## 2025-04-21 ASSESSMENT — PATIENT HEALTH QUESTIONNAIRE - PHQ9
2. FEELING DOWN, DEPRESSED OR HOPELESS: NOT AT ALL
SUM OF ALL RESPONSES TO PHQ9 QUESTIONS 1 AND 2: 0
1. LITTLE INTEREST OR PLEASURE IN DOING THINGS: NOT AT ALL

## 2025-04-21 NOTE — PROGRESS NOTES
Subjective   John Johnson is a 59 y.o. male.    Chief Complaint:  Follow-up    HPI  Physically doing very well.  No chest pain or anginal symptoms.  Went on his yearly snowboarding trip to [place] in [place] and did well.    Review of Systems   Constitutional: Negative for malaise/fatigue.   HENT:  Negative for congestion.    Eyes:  Negative for blurred vision.   Cardiovascular:  Negative for chest pain, dyspnea on exertion and palpitations.   Respiratory:  Negative for cough and shortness of breath.    Musculoskeletal:  Negative for joint pain.   Gastrointestinal:  Negative for abdominal pain.   Genitourinary:  Negative for dysuria and hematuria.   Neurological:  Negative for numbness and paresthesias.       Objective   Constitutional:       Appearance: Not in distress.   Eyes:      Conjunctiva/sclera: Conjunctivae normal.   Neck:      Vascular: JVD normal.   Pulmonary:      Breath sounds: Normal breath sounds. No wheezing. No rhonchi. No rales.   Cardiovascular:      Normal rate. Regular rhythm.      Murmurs: There is no murmur.      No gallop.  No click. No rub.   Abdominal:      Palpations: Abdomen is soft.   Neurological:      General: No focal deficit present.      Mental Status: Alert.         Lab Review:       Assessment/Plan   The primary encounter diagnosis was Elevated coronary artery calcium score. A diagnosis of Hypertriglyceridemia was also pertinent to this visit.    Hypertriglyceridemia  Dr. Mcfarland recently checked a fasting lipid panel: Total cholesterol is 134, triglycerides 343, HDL 34 and LDL 53.  We discussed low carbohydrate diet exercise and alcohol reduction as a means of lowering triglycerides.  He states his diet was very poor over the last couple months and will do better now.  Will recheck a fasting lipid panel on return visit.    Elevated coronary artery calcium score  Low-carb coronary artery calcium score.  We will continue standard risk factor modification and follow on a  clinical basis.

## 2025-04-21 NOTE — ASSESSMENT & PLAN NOTE
Low-carb coronary artery calcium score.  We will continue standard risk factor modification and follow on a clinical basis.

## 2025-05-02 DIAGNOSIS — M1A.9XX0 CHRONIC GOUT WITHOUT TOPHUS, UNSPECIFIED CAUSE, UNSPECIFIED SITE: ICD-10-CM

## 2025-05-02 RX ORDER — ALLOPURINOL 100 MG/1
100 TABLET ORAL DAILY
Qty: 90 TABLET | Refills: 3 | Status: SHIPPED | OUTPATIENT
Start: 2025-05-02

## 2025-05-22 ENCOUNTER — APPOINTMENT (OUTPATIENT)
Dept: PRIMARY CARE | Facility: CLINIC | Age: 60
End: 2025-05-22
Payer: COMMERCIAL

## 2025-06-06 DIAGNOSIS — M1A.9XX0 CHRONIC GOUT WITHOUT TOPHUS, UNSPECIFIED CAUSE, UNSPECIFIED SITE: ICD-10-CM

## 2025-06-06 RX ORDER — ALLOPURINOL 100 MG/1
100 TABLET ORAL DAILY
Qty: 90 TABLET | Refills: 3 | OUTPATIENT
Start: 2025-06-06

## 2025-07-07 PROBLEM — I71.21 ANEURYSM OF THE ASCENDING AORTA, WITHOUT RUPTURE: Status: ACTIVE | Noted: 2025-07-07

## 2025-07-07 NOTE — PROGRESS NOTES
Subjective   Patient ID:   John Johnson is a 60 y.o. male who presents for Establish Care.  HPI  New patient here today to establish care with myself.  Last PCP: Dr. Mcfarland  Last seen: June 2024.    Gout:  Had previously been on Allopurinol.  Has only had one gout flare up.  Gets uric acid check yearly typically.    HLD:  Taking Atorvastatin, Fenofibrate.  Last checked April 2025.    Rosacea:  Taking Doxycycline regularly.    Alcoholic fatty liver disease:  LFTs were slightly elevated in April 2025.    Aortic aneurysm/Elevated coronary artery calcium score:  Seeing cardiology.  BP is good today at 124/82.    Health maintenance:  Smoking: Never a smoker.  PSA (50+): April 2025  Labs: April 2025  Colonoscopy (50-75): Sep 2023.   Influenza:    Review of Systems  12 point review of systems negative unless stated above in HPI    Vitals:    07/16/25 1118   BP: 124/82   Pulse: 70   SpO2: 96%     Physical Exam  General: Alert and oriented, well nourished, no acute distress.  Lungs: Clear to auscultation, non-labored respiration.  Heart: Normal rate, regular rhythm, no murmur, gallop or edema.  Neurologic: Awake, alert, and oriented X3, CN II-XII intact.  Psychiatric: Cooperative, appropriate mood and affect.    Assessment/Plan   It was nice meeting you!  Continue specialty care.  We will plan on yearly labs with your physical.  Continue the same medications.  Chronic conditions are stable.  Call with questions or concerns.    Follow up  In April for physical  Diagnoses and all orders for this visit:  Hypertriglyceridemia  Alcoholic fatty liver  Chronic gout without tophus, unspecified cause, unspecified site  Aneurysm of the ascending aorta, without rupture  Elevated coronary artery calcium score  BMI 31.0-31.9,adult  Class 1 obesity without serious comorbidity with body mass index (BMI) of 31.0 to 31.9 in adult, unspecified obesity type  Rosacea

## 2025-07-16 ENCOUNTER — OFFICE VISIT (OUTPATIENT)
Dept: PRIMARY CARE | Facility: CLINIC | Age: 60
End: 2025-07-16
Payer: COMMERCIAL

## 2025-07-16 VITALS
HEART RATE: 70 BPM | OXYGEN SATURATION: 96 % | BODY MASS INDEX: 31.21 KG/M2 | DIASTOLIC BLOOD PRESSURE: 82 MMHG | HEIGHT: 70 IN | SYSTOLIC BLOOD PRESSURE: 124 MMHG | WEIGHT: 218 LBS

## 2025-07-16 DIAGNOSIS — L71.9 ROSACEA: ICD-10-CM

## 2025-07-16 DIAGNOSIS — E66.811 CLASS 1 OBESITY WITHOUT SERIOUS COMORBIDITY WITH BODY MASS INDEX (BMI) OF 31.0 TO 31.9 IN ADULT, UNSPECIFIED OBESITY TYPE: ICD-10-CM

## 2025-07-16 DIAGNOSIS — M1A.9XX0 CHRONIC GOUT WITHOUT TOPHUS, UNSPECIFIED CAUSE, UNSPECIFIED SITE: ICD-10-CM

## 2025-07-16 DIAGNOSIS — K70.0 ALCOHOLIC FATTY LIVER: ICD-10-CM

## 2025-07-16 DIAGNOSIS — R93.1 ELEVATED CORONARY ARTERY CALCIUM SCORE: ICD-10-CM

## 2025-07-16 DIAGNOSIS — E78.1 HYPERTRIGLYCERIDEMIA: Primary | ICD-10-CM

## 2025-07-16 DIAGNOSIS — I71.21 ANEURYSM OF THE ASCENDING AORTA, WITHOUT RUPTURE: ICD-10-CM

## 2025-07-16 PROBLEM — E79.0 HYPERURICEMIA: Status: ACTIVE | Noted: 2025-07-16

## 2025-07-16 PROBLEM — M25.512 PAIN IN LEFT SHOULDER: Status: ACTIVE | Noted: 2025-07-16

## 2025-07-16 PROBLEM — M79.672 PAIN IN LEFT FOOT: Status: ACTIVE | Noted: 2025-07-16

## 2025-07-16 PROBLEM — Z12.11 COLON CANCER SCREENING: Status: RESOLVED | Noted: 2023-12-14 | Resolved: 2025-07-16

## 2025-07-16 PROBLEM — F10.10 HARMFUL USE OF ALCOHOL: Status: ACTIVE | Noted: 2022-05-11

## 2025-07-16 PROBLEM — K76.0 STEATOSIS OF LIVER: Status: ACTIVE | Noted: 2023-08-28

## 2025-07-16 PROBLEM — M77.52 CAPSULITIS OF METATARSOPHALANGEAL (MTP) JOINT OF LEFT FOOT: Status: ACTIVE | Noted: 2025-07-16

## 2025-07-16 PROBLEM — R16.1 SPLENOMEGALY, NOT ELSEWHERE CLASSIFIED: Status: ACTIVE | Noted: 2022-05-11

## 2025-07-16 PROCEDURE — 99213 OFFICE O/P EST LOW 20 MIN: CPT | Performed by: PHYSICIAN ASSISTANT

## 2025-07-16 PROCEDURE — 1036F TOBACCO NON-USER: CPT | Performed by: PHYSICIAN ASSISTANT

## 2025-07-16 PROCEDURE — 3008F BODY MASS INDEX DOCD: CPT | Performed by: PHYSICIAN ASSISTANT

## 2025-07-16 ASSESSMENT — ENCOUNTER SYMPTOMS
LOSS OF SENSATION IN FEET: 0
OCCASIONAL FEELINGS OF UNSTEADINESS: 0
DEPRESSION: 0

## 2025-07-16 ASSESSMENT — PAIN SCALES - GENERAL: PAINLEVEL_OUTOF10: 0-NO PAIN

## 2025-08-18 DIAGNOSIS — R93.1 ELEVATED CORONARY ARTERY CALCIUM SCORE: ICD-10-CM

## 2025-08-18 DIAGNOSIS — E78.1 HYPERTRIGLYCERIDEMIA: ICD-10-CM

## 2025-08-18 RX ORDER — FENOFIBRATE 67 MG/1
67 CAPSULE ORAL
Qty: 90 CAPSULE | Refills: 3 | Status: SHIPPED | OUTPATIENT
Start: 2025-08-18 | End: 2026-08-18

## 2025-08-18 RX ORDER — ATORVASTATIN CALCIUM 10 MG/1
10 TABLET, FILM COATED ORAL DAILY
Qty: 90 TABLET | Refills: 3 | Status: SHIPPED | OUTPATIENT
Start: 2025-08-18 | End: 2026-08-18